# Patient Record
Sex: MALE | Race: WHITE | NOT HISPANIC OR LATINO | URBAN - NONMETROPOLITAN AREA
[De-identification: names, ages, dates, MRNs, and addresses within clinical notes are randomized per-mention and may not be internally consistent; named-entity substitution may affect disease eponyms.]

---

## 2023-10-28 DIAGNOSIS — I10 PRIMARY HYPERTENSION: ICD-10-CM

## 2023-10-28 DIAGNOSIS — I48.0 PAROXYSMAL ATRIAL FIBRILLATION (MULTI): ICD-10-CM

## 2023-10-30 PROBLEM — I48.0 PAROXYSMAL ATRIAL FIBRILLATION (MULTI): Status: ACTIVE | Noted: 2023-10-30

## 2023-10-30 PROBLEM — I10 HYPERTENSION: Status: ACTIVE | Noted: 2023-10-30

## 2023-10-30 PROBLEM — E11.9 DIABETES MELLITUS (MULTI): Status: ACTIVE | Noted: 2023-10-30

## 2023-10-30 PROBLEM — I48.3 TYPICAL ATRIAL FLUTTER (MULTI): Status: ACTIVE | Noted: 2023-10-30

## 2023-10-30 PROBLEM — I48.19 PERSISTENT ATRIAL FIBRILLATION (MULTI): Status: ACTIVE | Noted: 2023-10-30

## 2023-10-30 PROBLEM — I25.10 ATHEROSCLEROSIS OF NATIVE CORONARY ARTERY OF NATIVE HEART WITHOUT ANGINA PECTORIS: Status: ACTIVE | Noted: 2023-10-30

## 2023-10-30 PROBLEM — E78.5 HLD (HYPERLIPIDEMIA): Status: ACTIVE | Noted: 2023-10-30

## 2023-10-30 RX ORDER — HYDROXYCHLOROQUINE SULFATE 200 MG/1
200 TABLET, FILM COATED ORAL 2 TIMES DAILY
COMMUNITY

## 2023-10-30 RX ORDER — METHOTREXATE 2.5 MG/1
4 TABLET ORAL
COMMUNITY

## 2023-10-30 RX ORDER — FOLIC ACID 1 MG/1
1 TABLET ORAL DAILY
COMMUNITY
Start: 2018-12-21

## 2023-10-30 RX ORDER — LISINOPRIL AND HYDROCHLOROTHIAZIDE 12.5; 2 MG/1; MG/1
1 TABLET ORAL 2 TIMES DAILY
COMMUNITY
Start: 2018-12-21

## 2023-10-30 RX ORDER — METOPROLOL TARTRATE 25 MG/1
12.5 TABLET, FILM COATED ORAL 2 TIMES DAILY
Qty: 90 TABLET | Refills: 1 | Status: SHIPPED | OUTPATIENT
Start: 2023-10-30 | End: 2024-02-05

## 2023-10-30 RX ORDER — ALOGLIPTIN 6.25 MG/1
1 TABLET, FILM COATED ORAL DAILY
COMMUNITY

## 2023-10-30 RX ORDER — ATORVASTATIN CALCIUM 20 MG/1
1 TABLET, FILM COATED ORAL DAILY
COMMUNITY
Start: 2022-04-25

## 2023-10-30 RX ORDER — ASPIRIN 81 MG/1
1 TABLET ORAL DAILY
COMMUNITY

## 2023-10-30 RX ORDER — AMLODIPINE BESYLATE 10 MG/1
1 TABLET ORAL DAILY
COMMUNITY
Start: 2018-12-21

## 2023-10-30 RX ORDER — ALLOPURINOL 100 MG/1
1 TABLET ORAL DAILY
COMMUNITY

## 2023-10-30 RX ORDER — METOPROLOL TARTRATE 25 MG/1
12.5 TABLET, FILM COATED ORAL 2 TIMES DAILY
COMMUNITY

## 2024-02-02 DIAGNOSIS — I10 PRIMARY HYPERTENSION: ICD-10-CM

## 2024-02-02 DIAGNOSIS — I48.0 PAROXYSMAL ATRIAL FIBRILLATION (MULTI): ICD-10-CM

## 2024-02-05 RX ORDER — METOPROLOL TARTRATE 25 MG/1
12.5 TABLET, FILM COATED ORAL 2 TIMES DAILY
Qty: 90 TABLET | Refills: 1 | Status: SHIPPED | OUTPATIENT
Start: 2024-02-05

## 2024-03-13 PROBLEM — Z78.9 NEVER SMOKED TOBACCO: Status: ACTIVE | Noted: 2024-03-13

## 2024-03-13 PROBLEM — Z79.899 HIGH RISK MEDICATION USE: Status: ACTIVE | Noted: 2024-03-13

## 2024-03-19 ENCOUNTER — TELEPHONE (OUTPATIENT)
Dept: CARDIOLOGY | Facility: CLINIC | Age: 79
End: 2024-03-19
Payer: MEDICARE

## 2024-03-19 NOTE — TELEPHONE ENCOUNTER
----- Message from Norman Masterson DO sent at 3/19/2024  1:20 PM EDT -----  Regarding: POC  Patient has a history of paroxysmal atrial fibrillation, history of A-fib ablation, off anticoagulation therapy, no history of coronary disease and is clear for his intended cervical laminoplasty please send appropriate information as requested  ----- Message -----  From: Jackie Ricketts  Sent: 3/19/2024   8:16 AM EDT  To: Norman Masterson DO    MEDICAL RECORDS WILL BE SENT BY MATILDE

## 2024-05-28 ENCOUNTER — APPOINTMENT (OUTPATIENT)
Dept: CARDIOLOGY | Facility: CLINIC | Age: 79
End: 2024-05-28
Payer: MEDICARE

## 2024-07-16 ENCOUNTER — APPOINTMENT (OUTPATIENT)
Dept: CARDIOLOGY | Facility: CLINIC | Age: 79
End: 2024-07-16
Payer: MEDICARE

## 2024-09-17 ENCOUNTER — APPOINTMENT (OUTPATIENT)
Dept: CARDIOLOGY | Facility: CLINIC | Age: 79
End: 2024-09-17
Payer: MEDICARE

## 2024-12-17 ENCOUNTER — APPOINTMENT (OUTPATIENT)
Dept: CARDIOLOGY | Facility: CLINIC | Age: 79
End: 2024-12-17
Payer: MEDICARE

## 2025-01-15 ENCOUNTER — APPOINTMENT (OUTPATIENT)
Dept: CARDIOLOGY | Facility: CLINIC | Age: 80
End: 2025-01-15
Payer: MEDICARE

## 2025-02-12 ENCOUNTER — APPOINTMENT (OUTPATIENT)
Dept: CARDIOLOGY | Facility: CLINIC | Age: 80
End: 2025-02-12
Payer: MEDICARE

## 2025-02-12 VITALS
DIASTOLIC BLOOD PRESSURE: 62 MMHG | SYSTOLIC BLOOD PRESSURE: 116 MMHG | BODY MASS INDEX: 34.79 KG/M2 | HEART RATE: 64 BPM | WEIGHT: 243 LBS | HEIGHT: 70 IN

## 2025-02-12 DIAGNOSIS — E78.2 MIXED HYPERLIPIDEMIA: ICD-10-CM

## 2025-02-12 DIAGNOSIS — I48.0 PAROXYSMAL ATRIAL FIBRILLATION (MULTI): ICD-10-CM

## 2025-02-12 DIAGNOSIS — I25.10 ATHEROSCLEROSIS OF NATIVE CORONARY ARTERY OF NATIVE HEART WITHOUT ANGINA PECTORIS: Primary | ICD-10-CM

## 2025-02-12 DIAGNOSIS — I10 PRIMARY HYPERTENSION: ICD-10-CM

## 2025-02-12 PROBLEM — I48.19 PERSISTENT ATRIAL FIBRILLATION (MULTI): Status: RESOLVED | Noted: 2023-10-30 | Resolved: 2025-02-12

## 2025-02-12 PROBLEM — E11.9 DIABETES MELLITUS (MULTI): Status: RESOLVED | Noted: 2023-10-30 | Resolved: 2025-02-12

## 2025-02-12 PROBLEM — Z79.899 HIGH RISK MEDICATION USE: Status: RESOLVED | Noted: 2024-03-13 | Resolved: 2025-02-12

## 2025-02-12 PROBLEM — I48.3 TYPICAL ATRIAL FLUTTER (MULTI): Status: RESOLVED | Noted: 2023-10-30 | Resolved: 2025-02-12

## 2025-02-12 PROCEDURE — 1159F MED LIST DOCD IN RCRD: CPT | Performed by: NURSE PRACTITIONER

## 2025-02-12 PROCEDURE — 3078F DIAST BP <80 MM HG: CPT | Performed by: NURSE PRACTITIONER

## 2025-02-12 PROCEDURE — 1160F RVW MEDS BY RX/DR IN RCRD: CPT | Performed by: NURSE PRACTITIONER

## 2025-02-12 PROCEDURE — 99214 OFFICE O/P EST MOD 30 MIN: CPT | Performed by: NURSE PRACTITIONER

## 2025-02-12 PROCEDURE — 3074F SYST BP LT 130 MM HG: CPT | Performed by: NURSE PRACTITIONER

## 2025-02-12 ASSESSMENT — ENCOUNTER SYMPTOMS
NEAR-SYNCOPE: 0
DYSPNEA ON EXERTION: 0
SYNCOPE: 0
PND: 0
PALPITATIONS: 0
IRREGULAR HEARTBEAT: 0
ORTHOPNEA: 0

## 2025-02-12 NOTE — LETTER
"February 13, 2025     Feliz Love DO  [address]  [facility] [id number]    Patient: Norman Darling   YOB: 1945   Date of Visit: 2/12/2025       Dear Dr. Feliz Love, :    Thank you for referring Norman Darling to me for evaluation. Below are my notes for this consultation.  If you have questions, please do not hesitate to call me. I look forward to following your patient along with you.       Sincerely,     Sabi Gutierres, APRN-CNP      CC: No Recipients  ______________________________________________________________________________________    Chief Complaint  \"Doing ok\"     Reason for Visit  Annual follow up   Patient presents to the office today for outpatient follow-up for hypertension, hyperlipidemia, and coronary artery disease  Last evaluated in clinic by Dr. Masterson Sept 2023    Presents today in wheelchair for ease of transport due to chronic back pain.     Reports hospitalization for cervical spine procedure - no MACE.    History of Present Illness   Patient is an extremely pleasant 79-year-old gentleman who presents to the office today with no voiced cardiovascular complaints.  He reports that he is recovering from his cervical spine surgery, continues to be limited mostly due to chronic low back pain and actually presents today in a wheelchair.  From an activity standpoint he completed physical therapy, he rides a stationary bike 1 mile a day and tries to do some strengthening exercises.  He reports his prior PCI symptom was dyspnea on exertion and current daily activity is probably at 4 METS but does not generate aerobic capacity to produce dyspnea on exertion.  He otherwise continues to deny any type of palpitations.  No documented atrial fibrillation during surgical procedure.    Patient reports that overall has no complaint(s) of chest pain, chest pressure/discomfort, claudication, dyspnea, exertional chest pressure/discomfort, fatigue, irregular heart beat, and " "lower extremity edema    Daily activity:    > 4 METs  Denies any change in exercise capacity or functional tolerance since last office visit.    The importance of secondary prevention reviewed:  HTN:  optimal in office  HLD:  treated  DM: diet controlled  Smoker: denies  BMI:  Reviewed the merits of healthy lifestyle choices on overall cardiovascular health.      Review of Systems   Cardiovascular:  Negative for chest pain, dyspnea on exertion, irregular heartbeat, leg swelling, near-syncope, orthopnea, palpitations, paroxysmal nocturnal dyspnea and syncope.        Visit Vitals  /62 (BP Location: Left arm, Patient Position: Sitting)   Pulse 64   Ht 1.778 m (5' 10\")   Wt 110 kg (243 lb)   BMI 34.87 kg/m²   Smoking Status Never   BSA 2.33 m²     Physical Exam  Vitals and nursing note reviewed.   Constitutional:       Appearance: Normal appearance.   Cardiovascular:      Rate and Rhythm: Normal rate and regular rhythm.      Heart sounds: Normal heart sounds.      Comments: Trace ankle edema  Pulmonary:      Effort: Pulmonary effort is normal.      Breath sounds: Normal breath sounds.   Musculoskeletal:      Cervical back: Full passive range of motion without pain.      Right lower leg: No edema.      Left lower leg: No edema.   Skin:     General: Skin is cool.   Neurological:      Mental Status: He is alert and oriented to person, place, and time.   Psychiatric:         Attention and Perception: Attention normal.         Mood and Affect: Mood normal.         Behavior: Behavior is cooperative.      No Known Allergies    Current Outpatient Medications   Medication Instructions   • allopurinol (Zyloprim) 100 mg tablet 1 tablet, Daily   • amLODIPine (Norvasc) 10 mg tablet 1 tablet, Daily   • aspirin 81 mg EC tablet 1 tablet, Daily   • atorvastatin (Lipitor) 20 mg tablet 1 tablet, Daily   • folic acid (Folvite) 1 mg tablet 1 tablet, Daily   • lisinopriL-hydrochlorothiazide 20-12.5 mg tablet 1 tablet, 2 times daily   • " metoprolol tartrate (LOPRESSOR) 12.5 mg, oral, 2 times daily      Assessment:    Atherosclerosis of native coronary artery of native heart without angina pectoris  Jan 2020  pLAD PCI/Zulma 3.5x18mm  P/mCX  PCI/Zulma 2.16i54vr & 2.75 x 38mm  oRCA PCI/Zulma 4x18mm  LVEF 55-60%    Currently daily activity > 4 METs without concerning symptoms    HLD (hyperlipidemia)  Moderate intensity statin  [facility]    Hypertension  optimal in office      Paroxysmal atrial fibrillation (Multi)  Feb 2019 Afib RFA  Off anticoagulation  Regular auscultatory rate and rhythm in office today     BMI 34.0-34.9,adult  Reviewed the merits of healthy lifestyle choices on overall cardiovascular health.      Plan:     Through informed decision making process incorporating patients unique circumstances, the following treatment plan will be initiated:    1.  Prescription drug management of cardiovascular medication for efficacy, adherence to treatment, side effect assessment and polypharmacy. Current treatment clinically warranted and to continue without modifications.    2.  Return for follow-up; in the interim, contact the office if new symptoms arise.  Dr. Masterson 9 months     Sabi Gutierres MSN, APRN-CNP, PMHNP-[facility] [address]  [place], [place]     Please excuse any errors in grammar or translation related to this dictation. Voice recognition software was utilized to prepare this document.

## 2025-02-12 NOTE — ASSESSMENT & PLAN NOTE
Jan 2020  pLAD PCI/Zulma 3.5x18mm  P/mCX  PCI/Zulma 2.51z36ph & 2.75 x 38mm  oRCA PCI/Zulma 4x18mm  LVEF 55-60%    Currently daily activity > 4 METs without concerning symptoms

## 2025-02-12 NOTE — PROGRESS NOTES
"Chief Complaint  \"Doing ok\"     Reason for Visit  Annual follow up   Patient presents to the office today for outpatient follow-up for hypertension, hyperlipidemia, and coronary artery disease  Last evaluated in clinic by Dr. Masterson Sept 2023    Presents today in wheelchair for ease of transport due to chronic back pain.     Reports hospitalization for cervical spine procedure - no MACE.    History of Present Illness   Patient is an extremely pleasant 79-year-old gentleman who presents to the office today with no voiced cardiovascular complaints.  He reports that he is recovering from his cervical spine surgery, continues to be limited mostly due to chronic low back pain and actually presents today in a wheelchair.  From an activity standpoint he completed physical therapy, he rides a stationary bike 1 mile a day and tries to do some strengthening exercises.  He reports his prior PCI symptom was dyspnea on exertion and current daily activity is probably at 4 METS but does not generate aerobic capacity to produce dyspnea on exertion.  He otherwise continues to deny any type of palpitations.  No documented atrial fibrillation during surgical procedure.    Patient reports that overall has no complaint(s) of chest pain, chest pressure/discomfort, claudication, dyspnea, exertional chest pressure/discomfort, fatigue, irregular heart beat, and lower extremity edema    Daily activity:    > 4 METs  Denies any change in exercise capacity or functional tolerance since last office visit.    The importance of secondary prevention reviewed:  HTN:  optimal in office  HLD:  treated  DM: diet controlled  Smoker: denies  BMI:  Reviewed the merits of healthy lifestyle choices on overall cardiovascular health.      Review of Systems   Cardiovascular:  Negative for chest pain, dyspnea on exertion, irregular heartbeat, leg swelling, near-syncope, orthopnea, palpitations, paroxysmal nocturnal dyspnea and syncope.        Visit Vitals  BP " "116/62 (BP Location: Left arm, Patient Position: Sitting)   Pulse 64   Ht 1.778 m (5' 10\")   Wt 110 kg (243 lb)   BMI 34.87 kg/m²   Smoking Status Never   BSA 2.33 m²     Physical Exam  Vitals and nursing note reviewed.   Constitutional:       Appearance: Normal appearance.   Cardiovascular:      Rate and Rhythm: Normal rate and regular rhythm.      Heart sounds: Normal heart sounds.      Comments: Trace ankle edema  Pulmonary:      Effort: Pulmonary effort is normal.      Breath sounds: Normal breath sounds.   Musculoskeletal:      Cervical back: Full passive range of motion without pain.      Right lower leg: No edema.      Left lower leg: No edema.   Skin:     General: Skin is cool.   Neurological:      Mental Status: He is alert and oriented to person, place, and time.   Psychiatric:         Attention and Perception: Attention normal.         Mood and Affect: Mood normal.         Behavior: Behavior is cooperative.      No Known Allergies    Current Outpatient Medications   Medication Instructions    allopurinol (Zyloprim) 100 mg tablet 1 tablet, Daily    amLODIPine (Norvasc) 10 mg tablet 1 tablet, Daily    aspirin 81 mg EC tablet 1 tablet, Daily    atorvastatin (Lipitor) 20 mg tablet 1 tablet, Daily    folic acid (Folvite) 1 mg tablet 1 tablet, Daily    lisinopriL-hydrochlorothiazide 20-12.5 mg tablet 1 tablet, 2 times daily    metoprolol tartrate (LOPRESSOR) 12.5 mg, oral, 2 times daily      Assessment:    Atherosclerosis of native coronary artery of native heart without angina pectoris  Jan 2020  pLAD PCI/Zulma 3.5x18mm  P/mCX  PCI/Zulma 2.83g50tu & 2.75 x 38mm  oRCA PCI/Zulma 4x18mm  LVEF 55-60%    Currently daily activity > 4 METs without concerning symptoms    HLD (hyperlipidemia)  Moderate intensity statin  [facility]    Hypertension  optimal in office      Paroxysmal atrial fibrillation (Multi)  Feb 2019 Afib RFA  Off anticoagulation  Regular auscultatory rate and rhythm in office today "     BMI 34.0-34.9,adult  Reviewed the merits of healthy lifestyle choices on overall cardiovascular health.      Plan:     Through informed decision making process incorporating patients unique circumstances, the following treatment plan will be initiated:    1.  Prescription drug management of cardiovascular medication for efficacy, adherence to treatment, side effect assessment and polypharmacy. Current treatment clinically warranted and to continue without modifications.    2.  Return for follow-up; in the interim, contact the office if new symptoms arise.  Dr. Masterson 9 months     Sabi Gutierres MSN, APRN-CNP, PMHNP-[facility] [place]  [place], [place]     Please excuse any errors in grammar or translation related to this dictation. Voice recognition software was utilized to prepare this document.

## 2025-02-12 NOTE — PATIENT INSTRUCTIONS
Please bring all medicines, vitamins, and herbal supplements with you when you come to the office.    Prescriptions will not be filled unless you are compliant with your follow up appointments or have a follow up appointment scheduled as per instruction of your physician. Refills should be requested at the time of your visit.     PLAN:   Through informed decision making process incorporating patients unique circumstances, the following treatment plan will be initiated:    1.  Prescription drug management of cardiovascular medication for efficacy, adherence to treatment, side effect assessment and polypharmacy. Current treatment clinically warranted and to continue without modifications.    2.  Return for follow-up; in the interim, contact the office if new symptoms arise.  Dr. Masterson 9 months

## 2025-03-12 ENCOUNTER — APPOINTMENT (OUTPATIENT)
Dept: CARDIOLOGY | Facility: CLINIC | Age: 80
End: 2025-03-12
Payer: MEDICARE

## 2025-11-11 ENCOUNTER — APPOINTMENT (OUTPATIENT)
Dept: CARDIOLOGY | Facility: CLINIC | Age: 80
End: 2025-11-11
Payer: MEDICARE